# Patient Record
(demographics unavailable — no encounter records)

---

## 2025-02-07 NOTE — ASSESSMENT
[FreeTextEntry1] : Very pleasant 63-year-old gentleman who presents for follow-up of elevated PSA, abnormal MRI, erectile dysfunction -MRI images from [facility] radiology reviewed demonstrating an overall suspicion score of PI-RADS 3 with a 0.8 cm lesion located at the base of the right seminal vesicle with associated restricted diffusion and possible focal enhancement raising suspicion for a primary seminal vesicle lesion versus an exophytic prostate nodule -PSA most recently 3.6, down from 5.67 in May 2024 -Continue Cialis 20 mg for erectile dysfunction -We discussed potential etiologies of findings on MRI.  We discussed options for management moving forward, including repeating PSA at short interval, fusion guided prostate biopsy, or repeating prostate MRI in 2 to 3 months.  At this time he wishes to discuss his options with his family but is leaning towards having a biopsy performed, and will call back should he decide to schedule a biopsy -Urine culture -I discussed the recommendation to perform a transrectal ultrasound-guided prostate biopsy with the patient.  I reviewed the potential etiologies of an elevated PSA with the patient, including but not limited to prostate cancer, benign prostatic hyperplasia (BPH), inflammation of the prostate, urinary tract infection (UTI), older age, and sexual intercourse. I discussed the risks of a prostate biopsy with the patient, including but not limited to pain, rectal bleeding, blood in the urine and semen, difficulty or inability to urinate, and infection. We discussed the procedure itself, including the need to place a transrectal ultrasound probe in the rectum and take systematic biopsies of the prostate gland after providing a local anesthetic agent.  I explained the polo-procedural preparations that the patient will need to take, including self-administration of an enema the day of the biopsy.  Patient is being seen today for evaluation and management of a chronic and longitudinal ongoing condition and I am the primary treating physician

## 2025-02-07 NOTE — HISTORY OF PRESENT ILLNESS
[FreeTextEntry1] : Very pleasant 63-year-old gentleman who presents for follow-up of elevated PSA, erectile dysfunction.  May 2024 he was noted to have a PSA of 5.67.  He reports no history of an elevated PSA prior to this.  This was rechecked in October 2024 was found to decrease to 3.6.  He denies dysuria.  No hematuria.  No flank pain or suprapubic pain.  He reports erectile dysfunction for which he has used Cialis 20 mg in the past.  Because of elevated PSA he underwent a prostate MRI which demonstrated an overall suspicion score of PI-RADS 3 in the setting of a 64 cc prostate with a focus of restricted diffusion noted at the base of the right seminal vesicle.  He presents today to discuss the results as well as further management options.

## 2025-02-07 NOTE — PHYSICAL EXAM
[No Acute Distress] : no acute distress [Well Nourished] : well nourished [Well Developed] : well developed [Well-Appearing] : well-appearing [Normal Sclera/Conjunctiva] : normal sclera/conjunctiva [PERRL] : pupils equal round and reactive to light [EOMI] : extraocular movements intact [Normal Outer Ear/Nose] : the outer ears and nose were normal in appearance [Normal Oropharynx] : the oropharynx was normal [Normal TMs] : both tympanic membranes were normal [No JVD] : no jugular venous distention [No Lymphadenopathy] : no lymphadenopathy [Supple] : supple [Thyroid Normal, No Nodules] : the thyroid was normal and there were no nodules present [No Respiratory Distress] : no respiratory distress  [No Accessory Muscle Use] : no accessory muscle use [Clear to Auscultation] : lungs were clear to auscultation bilaterally [Normal Rate] : normal rate  [Regular Rhythm] : with a regular rhythm [Normal S1, S2] : normal S1 and S2 [No Murmur] : no murmur heard [No Carotid Bruits] : no carotid bruits [Pedal Pulses Present] : the pedal pulses are present [No Edema] : there was no peripheral edema [Soft] : abdomen soft [Non Tender] : non-tender [Non-distended] : non-distended [Normal Bowel Sounds] : normal bowel sounds [Normal Posterior Cervical Nodes] : no posterior cervical lymphadenopathy [Normal Anterior Cervical Nodes] : no anterior cervical lymphadenopathy [No CVA Tenderness] : no CVA  tenderness [No Spinal Tenderness] : no spinal tenderness [No Joint Swelling] : no joint swelling [Grossly Normal Strength/Tone] : grossly normal strength/tone [No Rash] : no rash [No Focal Deficits] : no focal deficits [Normal Gait] : normal gait [Normal Affect] : the affect was normal [Normal Insight/Judgement] : insight and judgment were intact

## 2025-02-14 NOTE — HISTORY OF PRESENT ILLNESS
[No Pertinent Cardiac History] : no history of aortic stenosis, atrial fibrillation, coronary artery disease, recent myocardial infarction, or implantable device/pacemaker [No Pertinent Pulmonary History] : no history of asthma, COPD, sleep apnea, or smoking [Chronic Kidney Disease] : chronic kidney disease [Diabetes] : diabetes [Chronic Anticoagulation] : no chronic anticoagulation [FreeTextEntry1] : Rt cataracr extraction with IOL [FreeTextEntry2] : 2/20/25 [FreeTextEntry3] : Dr. Johansen [FreeTextEntry4] : Mr. JIGNESH BOWER is a 63 year old male with Hx of HTN, DM II, CKD V, Hx of elevated PSA, and HLD, presenting for medical clearance prior to  rt cataract surgery on 2/20/25. He is accompanied by his daughter.    He has not taken his HTN medication this morning. He reports he takes nifedipine 90mg in the morning and Losartan in the evenings. Pt monitors his blood glucose at home and reports it has been stable (108). He is on insulin lispro 3units TID and long-acting Lantus 5units daily. He reports he has not been taking Lokelma daily and only takes it when he eats certain foods. He is following with Nephrology (Dr. French). Pt followed with urology and is scheduled for a prostate biopsy. Denies any SOB, CP, abdominal pain, N/V/D, headache, dizziness, or leg swelling.

## 2025-02-14 NOTE — ADDENDUM
[FreeTextEntry1] : Documented by Regina Will acting as a scribe for Dr. Mckeon. 02/07/2025   All medical record entries made by the scribe were at my, Dr. Mckeon, direction and personally dictated by me on 02/07/2025. I have reviewed the chart an agree that the record accurately reflects my personal performance of the history, physical exam, assessment and plan. I have also personally directed, reviewed, and agreed with the chart.

## 2025-02-14 NOTE — PLAN
[FreeTextEntry1] : Mr. JIGNESH BOWER is a 63 year old male with hx of HTN, DM II, CKD V, and HLD, medically stable for planned procedure pending repeat K  DM II hold  jardiance 10 mg QD 3 days prior to procedure cont. lantus 6 units QHS follow up w/ endocrinology  CKD/Hyperkalemia cont. Lokelma 10gm oral packet as directed we'll check potassium today   Hx of elevated PSA Following with Urology   HTN low sodium/low caffeine diet & exercise discussed w/ pt. cont. nifedipine 60 mg Qam, 30 mg QHS as directed cont. losartan 100 mg QD following w/ cardiology (Dr. Ignacio)  HLD low cholesterol/low fat diet & exercise discussed w/ pt. cont. rosuvastatin 40mg QD  Lower ext. edema cont. furosemide 40 mg PRN  EKG : NSR @ 67 bpm/ LVH ( old ) Echo 7/2024 : Left ventricular systolic function is hyperdynamic with EF 70%/LVH Labs : CKD with elevated K > need to repeat K

## 2025-03-03 NOTE — HISTORY OF PRESENT ILLNESS
[Diabetes Mellitus] : Diabetes Mellitus [TextBox_42] : Mr. Gordillo is a 63 y.o male who presents for kidney transplant evaluation.  He is not yet on dialysis.  He follows with Dr. Monisha Guillen.   His eGFR is 17.   Hx of HTN, DM II, CKD V, Hx of elevated PSA, and HLD Pt followed with urology (Dr. Ambrocio) and is scheduled for a prostate biopsy within 2 weeks.   Pt has had diabetes since 1995.  He started on oral hypoglycemics then in 2023 started insulin.  His A1c is now 6.  He is on Semglee 4 units and lispro 3 units with meals.  He has retinopathy and cataracts.  he denies neuropathy, has some edema.  No foot ulcers or amputations.  He can walk a few blocks, climb stairs.   He follows with Dr. Hawa Ignacio, denies CAD or stents.  No strokes.   He also has a history of nephrolithiasis, last stone was about 5 years ago.  He needed a stent once.    Meds:  Jardiance, nifedipine, lasix, crestor, insulin Surgical history:  no surgeries Social history:  no smoking, minimal alcohol, no drugs or marijuanna.  He is not .  Has 5 children.  Lives with daughter.   Family history:  mother had kidney disease and diabetes, lived to [facility].

## 2025-03-03 NOTE — PHYSICAL EXAM
[General Appearance - Alert] : alert [General Appearance - In No Acute Distress] : in no acute distress [General Appearance - Well Nourished] : well nourished [General Appearance - Well Developed] : well developed [Sclera] : the sclera and conjunctiva were normal [Extraocular Movements] : extraocular movements were intact [Strabismus] : no strabismus was seen [Outer Ear] : the ears and nose were normal in appearance [Hearing Threshold Finger Rub Not [place]] : hearing was normal [Nasal Cavity] : the nasal mucosa and septum were normal [Neck Appearance] : the appearance of the neck was normal [Neck Cervical Mass (___cm)] : no neck mass was observed [Respiration, Rhythm And Depth] : normal respiratory rhythm and effort [Auscultation Breath Sounds / Voice Sounds] : lungs were clear to auscultation bilaterally [Heart Rate And Rhythm] : heart rate was normal and rhythm regular [Heart Sounds] : normal S1 and S2 [Murmurs] : no murmurs [FreeTextEntry1] : trace LE edema.  [Bowel Sounds] : normal bowel sounds [Abdomen Soft] : soft [Abdomen Tenderness] : non-tender [Cervical Lymph Nodes Enlarged Posterior Bilaterally] : posterior cervical [Cervical Lymph Nodes Enlarged Anterior Bilaterally] : anterior cervical [Supraclavicular Lymph Nodes Enlarged Bilaterally] : supraclavicular [Abnormal Walk] : normal gait [Nail Clubbing] : no clubbing  or cyanosis of the fingernails [Musculoskeletal - Swelling] : no joint swelling seen [Skin Color & Pigmentation] : normal skin color and pigmentation [Skin Turgor] : normal skin turgor [] : no rash [No Focal Deficits] : no focal deficits [Oriented To Time, Place, And Person] : oriented to person, place, and time [Impaired Insight] : insight and judgment were intact [Affect] : the affect was normal

## 2025-03-03 NOTE — PLAN
[FreeTextEntry1] : 1.  CKD stage 4 - Mr. Gordillo is a good candidate for kidney transplantation.  His daughter (works for [employer]) would like to donate a kidney to him.   2.  CV - no history of CAD.  Will need stress and echo due to risk factors.  3.  HTN - stable.  4.  DM2 - he is on insulin.  A1c is controlled.  5.  Elevated PSA - will need results of prostate biopsy.   6.  Cancer screening - will need colonoscopy results  7.  Imaging - Ct a/p   I have personally discussed the risks and benefits of transplantation and patient attended transplant education class where the following was disclosed:   Reviewed factors affecting survival and morbidity while on dialysis, the transplant wait list and reviewed polo-operative and long-term risk factors affecting outcome in kidney transplantation.     One year SRTR outcomes for national and [facility] were discussed in regards to patient survival and graft survival after transplantation.     Details of transplant surgery, including complications were discussed. Immunosuppression and complications including infection including life threatening sepsis and opportunistic infections, malignancy and new onset diabetes were discussed.     Benefits of live donor transplantation as well as variability in wait times across regions and multiple listing were discussed.  KDPI >85% and PHS high risk criteria donors were discussed.  HCV kidney transplantation was discussed.   Will proceed with completing/ updating work up and listing for transplant/ live donor transplant once work up is reviewed and found to be acceptable by multidisciplinary listing committee.

## 2025-03-03 NOTE — CONSULT LETTER
[Dear  ___] : Dear  [unfilled], [Consult Letter:] : I had the pleasure of evaluating your patient, [unfilled]. [Please see my note below.] : Please see my note below. [Consult Closing:] : Thank you very much for allowing me to participate in the care of this patient.  If you have any questions, please do not hesitate to contact me. [Sincerely,] : Sincerely, [FreeTextEntry3] : Iain Sheikh, DO

## 2025-03-03 NOTE — HISTORY OF PRESENT ILLNESS
[TextBox_42] : Cause of Kidney Failure: Diabetes Mellitus Mr. Gordillo is a 63 y.o male who presents for kidney transplant evaluation. He is not yet on dialysis. He follows with Dr. Monisha Guillen. His eGFR is 17. Hx of HTN, DM II, CKD V, Hx of elevated PSA, and HLD Pt followed with urology (Dr. Ambrocio) and is scheduled for a prostate biopsy within 2 weeks. Pt has had diabetes since 1995. He started on oral hypoglycemics then in 2023 started insulin. His A1c is now 6. He is on Semglee 4 units and lispro 3 units with meals. He has retinopathy and cataracts. he denies neuropathy, has some edema. No foot ulcers or amputations. He can walk a few blocks, climb stairs. He follows with Dr. Hawa Ignacio, denies CAD or stents. No strokes. He also has a history of nephrolithiasis, last stone was about 5 years ago. He needed a stent once.  Meds: Jardiance, nifedipine, lasix, crestor, insulin Surgical history: no surgeries Social history: no smoking, minimal alcohol, no drugs or marijuanna. He is not . Has 5 children. Lives with daughter. no working, previously worked as  (stopped due to vision) Family history: mother had kidney disease and diabetes, lived to [facility].

## 2025-03-03 NOTE — ASSESSMENT
[Excellent candidate] : an excellent candidate. We should proceed with our protocol for evaluation for kidney transplantation. [FreeTextEntry1] : cardiac eval and clearance ct a/p noncon cxr

## 2025-03-26 NOTE — DISCUSSION/SUMMARY
[FreeTextEntry1] : He is a 63-year-old who presents today for pretransplant cardiac evaluation prior to potential renal transplant with history as above.  He will schedule an echocardiogram for structural heart evaluation. A nuclear stress test will evaluate for any evidence of inducible ischemia.   Follow up pending results.  [EKG obtained to assist in diagnosis and management of assessed problem(s)] : EKG obtained to assist in diagnosis and management of assessed problem(s)

## 2025-03-26 NOTE — HISTORY OF PRESENT ILLNESS
[FreeTextEntry1] : Tarik Gordillo presents today for pretransplant cardiac evaluation prior to potential renal transplant.   He is a 63-year-old man, born in [place], living in the  for 38 years, with known cardiac risk factors of chronic hypertension, dyslipidemia (, total cholesterol 114, HDL 43, LDL 49 mg/dL), diabetes mellitus (diagnosed , on insulin (inhaled with meals and long acting sub-q insulin, A1c 6.7%, + retinopathy), and chronic renal disease (preemptive candidate, creatinine 4.22 mg/dL, e GFR 15).   He also notes a history of renal stents.  He is pending prostate biopsy next week.   His parents are . Mom  at 100 years old, she had DM2, renal disease.  He has 4 sisters, some with diabetes. He has 4 brothers, 1  during Covid. He is single. He has 5 daughters. 2 daughters are considering potential donation, so is his ex-wife.  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   He reports no physical limitations and denies any chest discomfort, shortness of breath, palpitations, lightheadedness or syncope.

## 2025-03-28 NOTE — ADDENDUM
[FreeTextEntry1] : Documented by Regina Will acting as a scribe for Dr. Mckeon. 03/28/2025   All medical record entries made by the scribe were at my, Dr. Mckeon, direction and personally dictated by me on 03/28/2025. I have reviewed the chart an agree that the record accurately reflects my personal performance of the history, physical exam, assessment and plan. I have also personally directed, reviewed, and agreed with the chart.

## 2025-03-28 NOTE — HISTORY OF PRESENT ILLNESS
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 Mclaughlin [FreeTextEntry4] : Mr. JIGNESH BOWER is a 63-year-old male with Hx of HTN, DM II, CKD V, Hx of elevated PSA, and HLD, presenting for medical clearance prior to RT eye pars plana vitrectomy on 4/11/25. He is accompanied by his daughter.  Pt reports the morning of surgery he will only take HTN medications.  He is following with Endocrinology (Dr. Mcgee) for DM, is on Lantus 4units, and was started on Afrezza Insulin inhaler, 8units TID with meals.  Pt is following with Nephrology 4/1 (Dr. Yakelin Guillen) and reports he is eligible for a kidney transplant. His daughter reports she is awaiting testing to see if she is a match.  Pt reports he is scheduled for a prostate biopsy on 4/4/25 and is following with Dr. Ambrocio.

## 2025-03-28 NOTE — PLAN
[FreeTextEntry1] : Mr. JIGNESH BOWER is a 63 year old male with hx of HTN, DM II, CKD V, and HLD, medically stable for planned procedure   DM II cont. Jardiance 10 mg QD  cont. Lantus 4 units QHS cont. Afrezza inhaler 8 units TID with meals follows with endocrinology   CKD/Hyperkalemia cont. Lokelma 10gm oral packet as directed we'll check potassium today  Hx of elevated PSA awaiting prostate biopsy 4/4 Following with Urology  HTN low sodium/low caffeine diet & exercise discussed w/ pt. cont. nifedipine 60 mg Qam, 30 mg QHS as directed cont. losartan 100 mg QD following w/ cardiology (Dr. Ignacio)  HLD low cholesterol/low fat diet & exercise discussed w/ pt. cont. rosuvastatin 40mg QD  Lower ext. edema cont. furosemide 40 mg PRN  Bloodwork ordered.  Follow up in one week for lab results.

## 2025-04-30 NOTE — PHYSICAL EXAM
[No Acute Distress] : no acute distress [Well Nourished] : well nourished [Well Developed] : well developed [Well-Appearing] : well-appearing [Normal Sclera/Conjunctiva] : normal sclera/conjunctiva [Normal Outer Ear/Nose] : the outer ears and nose were normal in appearance [Normal Oropharynx] : the oropharynx was normal [No JVD] : no jugular venous distention [No Lymphadenopathy] : no lymphadenopathy [Supple] : supple [No Respiratory Distress] : no respiratory distress  [No Accessory Muscle Use] : no accessory muscle use [Clear to Auscultation] : lungs were clear to auscultation bilaterally [Normal Rate] : normal rate  [Regular Rhythm] : with a regular rhythm [Normal S1, S2] : normal S1 and S2 [Pedal Pulses Present] : the pedal pulses are present [No Edema] : there was no peripheral edema [No Extremity Clubbing/Cyanosis] : no extremity clubbing/cyanosis [Soft] : abdomen soft [Non Tender] : non-tender [Non-distended] : non-distended [Normal Posterior Cervical Nodes] : no posterior cervical lymphadenopathy [Normal Anterior Cervical Nodes] : no anterior cervical lymphadenopathy [No CVA Tenderness] : no CVA  tenderness [No Spinal Tenderness] : no spinal tenderness [Grossly Normal Strength/Tone] : grossly normal strength/tone [No Rash] : no rash [Coordination Grossly Intact] : coordination grossly intact [No Focal Deficits] : no focal deficits [Normal Gait] : normal gait [Normal Affect] : the affect was normal [Normal Insight/Judgement] : insight and judgment were intact [de-identified] : + Rt olecranon bursitis

## 2025-04-30 NOTE — REVIEW OF SYSTEMS
[Joint Swelling] : joint swelling [Negative] : Heme/Lymph [FreeTextEntry9] : Rt elbow swelling /pain

## 2025-04-30 NOTE — HISTORY OF PRESENT ILLNESS
[de-identified] : Mr. JIGNESH BOWER is a 63 year old male with Hx of , presenting for a follow up on chronic problems.   Pt states he is feeling well. He was scheduled for Rt eye pars plana vitrectomy for Complex retinal detachment on 4/11 but has not had the procedure done yet d/t changing insurance.  Recent labs revealed elevated potassium. Pt followed with his nephrologist 2 weeks ago (Dr. Yakelin Guillen) and is currently on losartan 50mg daily and was started on labetalol 100mg BID last visit.  Pt reports his BP has been elevated and is following with cardiology.   Pt c/o lump/swelling on Rt elbow that started 1 month ago. He visited urgent care 4/5, had fluid drained, and was started on cephalexin 500mg for 5 days; Pt reports he finished antibiotics 4/15.  Denies any SOB, CP, or abdominal pain.

## 2025-04-30 NOTE — PLAN
[FreeTextEntry1] : Follow up   Pt instructed to bring list of current medications   Olecranon bursitis of Rt elbow  Xray elbow  restart cephalexin 500mg for 5 days  Referred to Ortho surgery   DM II cont. Jardiance 10 mg QD  cont. Lantus 4 units QHS cont. Afrezza inhaler 8 units TID with meals follows with endocrinology   CKD/Hyperkalemia cont. Lokelma 10gm oral packet as directed we'll check potassium today following with nephrology (Dr. Yakelin Guillen)  Hx of elevated PSA Following with Urology  HTN  low sodium/low caffeine diet & exercise discussed w/ pt. cont. nifedipine 60 mg Qam, 30 mg QHS as directed cont. losartan 100 mg QD increase labetalol to  200mg BID  following w/ cardiology (Dr. Ignacio)  HLD low cholesterol/low fat diet & exercise discussed w/ pt. cont. rosuvastatin 40mg QD  Lower ext. edema cont. furosemide 40 mg PRN  Bloodwork ordered.  Follow up in one week for lab results and BP recheck.

## 2025-04-30 NOTE — ADDENDUM
[FreeTextEntry1] : Documented by Regina Will acting as a scribe for Dr. Mckeon. 04/30/2025   All medical record entries made by the scribe were at my, Dr. Mckeon, direction and personally dictated by me on 04/30/2025. I have reviewed the chart an agree that the record accurately reflects my personal performance of the history, physical exam, assessment and plan. I have also personally directed, reviewed, and agreed with the chart.

## 2025-05-04 NOTE — HISTORY OF PRESENT ILLNESS
[de-identified] : 63 yr old M with PMHx CKD, T2DM, HTN, HLD presenting with R elbow pain and swelling without clear inciting trauma onset ~ 1 month ago. Patient reports he may have "bumped it" while moving boxes, but denies any other significant trauma or prior injury. Reports swelling came on slowly 3-4 days after initial potential injury prompting the patient to go to the ED where he had it drained and was started on antibiotics. Patient did have some relief, but fluid reaccumulated over next 1-2 weeks. Pain is described as burning and localized to the back of the elbow with associated swelling and local tingling. Denies any weakness or numbness.  Denies any symptoms going further down arm or into hand. Has not trialed OTC oral/topical pain medications. Reports pain is moderate at times, but overall tolerable. Patient has not participated in PT or done any other conservative measures (ice, heat, compression) for this issue.

## 2025-05-04 NOTE — PHYSICAL EXAM
[de-identified] :   Arm/elbow (R)  Inspection Ecchymosis: none   Effusion: none Bursa swelling: + olecranon bursa swelling  Palpation Tenderness: + over olecranon process  ROM Elbow flexion ROM: normal Elbow extension ROM: normal Elbow pronation ROM: normal Elbow supination ROM: normal Wrist flexion Normal. Wrist extension Normal  Motor Strength Wrist extension: 5/5 Wrist flexion: 5/5 : 5/5   Elbow motor strength. Flexion: 5/5 Extension: 5/5 Supination: 5/5 Pronation: 5/5  Stability Normal Sensory index Normal  Special Tests Passive epicondylitis test: Negative Lateral epicondylitis test: Negative Medial epicondylitis test: negative Valgus stress test: negative Tinnels sign: normal  [de-identified] :   XR of R elbow Date: 4/30/2025   Views: 2 Performed at: [facility] Impression: Minimal degenerative change. Nonspecific soft tissue swelling along the dorsal aspect of the elbow. Differential diagnosis includes olecranon bursitis.  These images were personally reviewed with original findings documented as above.

## 2025-05-04 NOTE — PHYSICAL EXAM
[de-identified] :   Arm/elbow (R)  Inspection Ecchymosis: none   Effusion: none Bursa swelling: + olecranon bursa swelling  Palpation Tenderness: + over olecranon process  ROM Elbow flexion ROM: normal Elbow extension ROM: normal Elbow pronation ROM: normal Elbow supination ROM: normal Wrist flexion Normal. Wrist extension Normal  Motor Strength Wrist extension: 5/5 Wrist flexion: 5/5 : 5/5   Elbow motor strength. Flexion: 5/5 Extension: 5/5 Supination: 5/5 Pronation: 5/5  Stability Normal Sensory index Normal  Special Tests Passive epicondylitis test: Negative Lateral epicondylitis test: Negative Medial epicondylitis test: negative Valgus stress test: negative Tinnels sign: normal  [de-identified] :   XR of R elbow Date: 4/30/2025   Views: 2 Performed at: [facility] Impression: Minimal degenerative change. Nonspecific soft tissue swelling along the dorsal aspect of the elbow. Differential diagnosis includes olecranon bursitis.  These images were personally reviewed with original findings documented as above.

## 2025-05-04 NOTE — DISCUSSION/SUMMARY
[de-identified] : 63 yr old M PMHx CKD, T2DM, HTN, HLD here with R elbow pain and swelling likely 2/2 olecranon bursitis.  Discussed likely diagnosis and plan as outlined below. Patient expressed understanding and agreement.  1. Discussed treatment options including risk/benefits of aspiration with CSI vs. conservative measures with compression/ice, anti-inflammatory/pain medications (OTC NSAIDs contraindicated 2/2 CKD). Patient opted for conservative measures with compression and ice and provided patient education on proper compression sleeve use and icing. 2. Follow up 3 months or sooner if needed for persistent/worsening symptoms.

## 2025-05-04 NOTE — HISTORY OF PRESENT ILLNESS
[de-identified] : 63 yr old M with PMHx CKD, T2DM, HTN, HLD presenting with R elbow pain and swelling without clear inciting trauma onset ~ 1 month ago. Patient reports he may have "bumped it" while moving boxes, but denies any other significant trauma or prior injury. Reports swelling came on slowly 3-4 days after initial potential injury prompting the patient to go to the ED where he had it drained and was started on antibiotics. Patient did have some relief, but fluid reaccumulated over next 1-2 weeks. Pain is described as burning and localized to the back of the elbow with associated swelling and local tingling. Denies any weakness or numbness.  Denies any symptoms going further down arm or into hand. Has not trialed OTC oral/topical pain medications. Reports pain is moderate at times, but overall tolerable. Patient has not participated in PT or done any other conservative measures (ice, heat, compression) for this issue.

## 2025-05-04 NOTE — DISCUSSION/SUMMARY
[de-identified] : 63 yr old M PMHx CKD, T2DM, HTN, HLD here with R elbow pain and swelling likely 2/2 olecranon bursitis.  Discussed likely diagnosis and plan as outlined below. Patient expressed understanding and agreement.  1. Discussed treatment options including risk/benefits of aspiration with CSI vs. conservative measures with compression/ice, anti-inflammatory/pain medications (OTC NSAIDs contraindicated 2/2 CKD). Patient opted for conservative measures with compression and ice and provided patient education on proper compression sleeve use and icing. 2. Follow up 3 months or sooner if needed for persistent/worsening symptoms.

## 2025-05-05 NOTE — ADDENDUM
[FreeTextEntry1] : Documented by Regina Will acting as a scribe for Dr. Mckeon. 05/02/2025   All medical record entries made by the scribe were at my, Dr. Mckeon, direction and personally dictated by me on 05/02/2025. I have reviewed the chart an agree that the record accurately reflects my personal performance of the history, physical exam, assessment and plan. I have also personally directed, reviewed, and agreed with the chart.

## 2025-05-05 NOTE — PLAN
[FreeTextEntry1] : Mr. JIGNESH BOWER is a 63 year old male with hx of HTN, DM II, CKD V, Hx of elevated PSA, and HLD, medically stable for planned procedure.   Labs/EKG reviewed  EKG: NSR, 65 bpm/ LVH  DM II - stop Jardiance 10mg 3 days before procedure, cont. Lantus 4 units night before, do not take Lantus morning of surgery cont. Afrezza inhaler 8 units TID with meals follows with endocrinology  CKD/Hyperkalemia> stable cont. Lokelma 10gm oral packet as directed follows with Nephrology   Hx of elevated PSA Following with Urology  HTN low sodium/low caffeine diet & exercise discussed w/ pt. cont. nifedipine 60 mg Qam, 30 mg QHS as directed cont. on losartan 100 mg QD restart Labetalol 100 mg BID  HLD low cholesterol/low fat diet & exercise discussed w/ pt. cont. rosuvastatin 40mg QD  Lower ext. edema cont. furosemide 40 mg PRN

## 2025-05-05 NOTE — HISTORY OF PRESENT ILLNESS
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 Mclaughlin [FreeTextEntry4] : Mr. JIGNESH BOWER is a 63-year-old male with Hx of HTN, DM II, CKD V, Hx of elevated PSA, and HLD, presenting for medical clearance prior to RT eye pars plana vitrectomy on 5/9/25. He is accompanied by his daughters.  Pt is following with Endocrinology for DM and is currently taking Lantus 4 units and Jardiance 10mg. He is continuing on Losartan after following with Nephrology; recent labs show Pt's potassium has improved.   Denies any SOB, CP, abdominal pain, N/V/D, headache, or dizziness, and reports compliance with taking his meds daily.

## 2025-05-19 NOTE — ASSESSMENT
[FreeTextEntry1] : Very pleasant 63-year-old gentleman who presents for follow-up of elevated PSA, abnormal MRI, erectile dysfunction -MRI demonstrates overall suspicion score of PI-RADS 3 with a 0.8 cm lesion located at the base of the right seminal vesicle with associated restricted diffusion and possible focal enhancement raising suspicion for a primary seminal vesicle lesion versus an exophytic prostate nodule -PSA most recently 3.6, down from 5.67 in May 2024 -Continue Cialis 20 mg for erectile dysfunction -Results of prostate biopsy reviewed with patient in detail demonstrating benign prostate tissue in all 13 cores, including at the site of the target lesion - Repeat PSA in 6 months and follow-up at that time  Patient is being seen today for evaluation and management of a chronic and longitudinal ongoing condition and I am the primary treating physician

## 2025-05-19 NOTE — HISTORY OF PRESENT ILLNESS
[FreeTextEntry1] : Very pleasant 63-year-old gentleman who presents for follow-up of elevated PSA, erectile dysfunction, abnormal MRI.  May 2024 he was noted to have a PSA of 5.67.  This was rechecked in October 2024 was found to decrease to 3.6.  Because of elevated PSA he underwent a prostate MRI which demonstrated an overall suspicion score of PI-RADS 3 in the setting of a 64 cc prostate with a focus of restricted diffusion noted at the base of the right seminal vesicle.  He underwent a fusion guided prostate biopsy which demonstrates benign prostate tissue in all 13 cores, including at the site of the target lesion.  He presents today to discuss these results as well as further management options.  He continues to use Cialis 20 mg as needed for erectile dysfunction.

## 2025-07-21 NOTE — HEALTH RISK ASSESSMENT
[Excellent] : ~his/her~ current health as excellent [Good] : ~his/her~  mood as  good [Yes] : Yes [Little interest or pleasure doing things] : 1) Little interest or pleasure doing things [1] : 1) Little interest or pleasure doing things for several days (1) [Feeling down, depressed, or hopeless] : 2) Feeling down, depressed, or hopeless [0] : 2) Feeling down, depressed, or hopeless: Not at all (0) [PHQ-2 Negative - No further assessment needed] : PHQ-2 Negative - No further assessment needed [Never] : Never [Patient reported colonoscopy was normal] : Patient reported colonoscopy was normal [With Family] : lives with family [# of Members in Household ___] :  household currently consist of [unfilled] member(s) [On disability] : on disability [Retired] : retired [Less Than High School] : less than high school [] :  [# Of Children ___] : has [unfilled] children [Feels Safe at Home] : Feels safe at home [de-identified] : Rarely. [de-identified] : Maintains acitve by walking. [de-identified] : Maintains a healthy diet.  [GFN3Hasqa] : 1 [ColonoscopyDate] : About five years ago [ColonoscopyComments] : Does not recall when he was told to return.  [de-identified] : Lives with his ex-wife and his daughter. [FreeTextEntry3] : Has five daughters.

## 2025-07-21 NOTE — ADDENDUM
[FreeTextEntry1] : Documented by Regina Will acting as a scribe for Dr. Mckeon. 07/21/2025   All medical record entries made by the scribe were at my, Dr. Mckeon, direction and personally dictated by me on 07/21/2025. I have reviewed the chart an agree that the record accurately reflects my personal performance of the history, physical exam, assessment and plan. I have also personally directed, reviewed, and agreed with the chart.

## 2025-07-21 NOTE — PLAN
[FreeTextEntry1] : Annual Physical   DM II cont. Jardiance 10 mg QD cont. Lantus 4 units QHS cont. Afrezza inhaler 8 units TID with meals follows with endocrinology Reinforced the importance of following a low carb/low sugar diet. We'll check glucose and HbA1c today  CKD/Hyperkalemia cont. Lokelma 10gm oral packet as directed we'll check potassium today following with nephrology (Dr. Yakelin Guillen)  Low vitamin D cont. vitamin D 2000UT QD   Hx of elevated PSA we'll check PSA today  Following with Urology  HTN low sodium/low caffeine diet & exercise discussed w/ pt. cont. nifedipine 60 mg Qam, 30 mg QHS as directed cont. losartan 100 mg QD cont. labetalol 100mg BID following w/ cardiology (Dr. Ignacio)  HLD low cholesterol/low fat diet & exercise discussed w/ pt. cont. rosuvastatin 40mg QD We'll check lipids and LFTs today.  Lower ext. edema cont. furosemide 40 mg PRN  Bloodwork ordered. Follow up in one week for lab results

## 2025-07-21 NOTE — HISTORY OF PRESENT ILLNESS
[de-identified] : Mr. JIGNESH BOWER is a 63-year-old male with Hx of HTN, DM II, CKD V, Hx of elevated PSA, and HLD, presenting for an annual physical exam.   Pt states he is feeling well and reports his vision has improved after eye surgery 5/2025.  He monitors his sugars, reports an FBS of 150 today, and is following with endocrinology for DM II. He has not recently followed up with cardiology.  He is following with nephrologist for CKD Denies any CP, SOB, or abdominal pain.

## 2025-07-21 NOTE — PHYSICAL EXAM
[No Acute Distress] : no acute distress [Well Nourished] : well nourished [Well Developed] : well developed [Well-Appearing] : well-appearing [Normal Sclera/Conjunctiva] : normal sclera/conjunctiva [PERRL] : pupils equal round and reactive to light [EOMI] : extraocular movements intact [Normal Outer Ear/Nose] : the outer ears and nose were normal in appearance [Normal Oropharynx] : the oropharynx was normal [Normal TMs] : both tympanic membranes were normal [No JVD] : no jugular venous distention [No Lymphadenopathy] : no lymphadenopathy [Supple] : supple [Thyroid Normal, No Nodules] : the thyroid was normal and there were no nodules present [No Respiratory Distress] : no respiratory distress  [No Accessory Muscle Use] : no accessory muscle use [Clear to Auscultation] : lungs were clear to auscultation bilaterally [Normal Rate] : normal rate  [Regular Rhythm] : with a regular rhythm [Normal S1, S2] : normal S1 and S2 [No Murmur] : no murmur heard [No Carotid Bruits] : no carotid bruits [Pedal Pulses Present] : the pedal pulses are present [No Edema] : there was no peripheral edema [No Extremity Clubbing/Cyanosis] : no extremity clubbing/cyanosis [Soft] : abdomen soft [Non Tender] : non-tender [Non-distended] : non-distended [No Masses] : no abdominal mass palpated [Normal Bowel Sounds] : normal bowel sounds [No Hernias] : no hernias [Normal Posterior Cervical Nodes] : no posterior cervical lymphadenopathy [Normal Anterior Cervical Nodes] : no anterior cervical lymphadenopathy [No CVA Tenderness] : no CVA  tenderness [No Spinal Tenderness] : no spinal tenderness [No Joint Swelling] : no joint swelling [Grossly Normal Strength/Tone] : grossly normal strength/tone [No Rash] : no rash [Coordination Grossly Intact] : coordination grossly intact [No Focal Deficits] : no focal deficits [Normal Gait] : normal gait [Normal Affect] : the affect was normal [Normal Insight/Judgement] : insight and judgment were intact